# Patient Record
(demographics unavailable — no encounter records)

---

## 2017-09-22 NOTE — RADIOLOGY REPORT (SQ)
EXAM DESCRIPTION:  U/S ABDOMEN LIMITED W/O DOP



COMPLETED DATE/TIME:  9/22/2017 8:17 am



REASON FOR STUDY:  CIRRHOSIS (ALCOHOLIC) OF LIVER WITH ASCITES (K70.31) K70.31  ALCOHOLIC CIRRHOSIS O
F LIVER WITH ASCITES



COMPARISON:  Abdominal ultrasound 8/15/2016, 3/16/2016, 8/5/2015



TECHNIQUE:  Dynamic and static grayscale images acquired of the abdomen and recorded on PACS. Additio
nal selected color Doppler and spectral images recorded.



LIMITATIONS:  Midline bowel gas



FINDINGS:  PANCREAS: Midline pancreas unremarkable

LIVER: Normal size, nodular contour from cirrhosis.  Coarse echotexture from diffuse hepatocellular d
isease.  No discrete masses are identified.

LIVER VASCULATURE: Normal directional flow of the main portal vein and hepatic veins.

GALLBLADDER: No stones. Normal wall thickness. No pericholecystic fluid.

ULTRASOUND-DETECTED MILLER'S SIGN: Negative.

INTRAHEPATIC DUCTS AND COMMON DUCT: CBD and intrahepatic ducts normal caliber. No filling defects.

INFERIOR VENA CAVA: Normal flow.

AORTA: No aneurysm.

RIGHT KIDNEY:  Normal size. Normal echogenicity. No solid or suspicious masses. No hydronephrosis. No
 calcifications.

PERITONEAL AND RIGHT PLEURAL SPACE: No ascites or effusions.

OTHER: No other significant findings.



IMPRESSION:  Nodular appearing liver from cirrhosis, similar compared to previous ultrasound exams.

No ultrasound evidence of gallstones/ cholecystitis

No right upper quadrant free fluid



TECHNICAL DOCUMENTATION:  JOB ID:  [id number]

 [employer]- All Rights Reserved